# Patient Record
Sex: MALE | Race: WHITE | Employment: OTHER | URBAN - METROPOLITAN AREA
[De-identification: names, ages, dates, MRNs, and addresses within clinical notes are randomized per-mention and may not be internally consistent; named-entity substitution may affect disease eponyms.]

---

## 2018-01-22 ENCOUNTER — HOSPITAL ENCOUNTER (EMERGENCY)
Facility: CLINIC | Age: 28
Discharge: HOME | End: 2018-01-22
Attending: PHYSICIAN ASSISTANT | Admitting: PHYSICIAN ASSISTANT
Payer: COMMERCIAL

## 2018-01-22 VITALS
OXYGEN SATURATION: 99 % | TEMPERATURE: 97.7 F | HEART RATE: 100 BPM | DIASTOLIC BLOOD PRESSURE: 88 MMHG | HEIGHT: 69 IN | WEIGHT: 140 LBS | SYSTOLIC BLOOD PRESSURE: 112 MMHG | BODY MASS INDEX: 20.73 KG/M2 | RESPIRATION RATE: 24 BRPM

## 2018-01-22 DIAGNOSIS — K13.79 MOUTH PAIN: ICD-10-CM

## 2018-01-22 PROCEDURE — 99283 EMERGENCY DEPT VISIT LOW MDM: CPT

## 2018-01-22 RX ORDER — PENICILLIN V POTASSIUM 500 MG/1
500 TABLET, FILM COATED ORAL 4 TIMES DAILY
Qty: 28 TABLET | Refills: 0 | Status: SHIPPED | OUTPATIENT
Start: 2018-01-22 | End: 2018-01-29

## 2018-01-22 RX ORDER — IBUPROFEN 600 MG/1
600 TABLET, FILM COATED ORAL EVERY 6 HOURS PRN
Qty: 30 TABLET | Refills: 0 | Status: SHIPPED | OUTPATIENT
Start: 2018-01-22 | End: 2020-03-30

## 2018-01-22 RX ORDER — HYDROCODONE BITARTRATE AND ACETAMINOPHEN 5; 325 MG/1; MG/1
1-2 TABLET ORAL EVERY 4 HOURS PRN
Qty: 12 TABLET | Refills: 0 | Status: SHIPPED | OUTPATIENT
Start: 2018-01-22 | End: 2020-08-09

## 2018-01-22 ASSESSMENT — ENCOUNTER SYMPTOMS: FEVER: 0

## 2018-01-22 NOTE — ED AVS SNAPSHOT
[facility] Emergency Department    [address]    [facility] [address]    Phone:  [phone]    Fax:  [phone]                                       Allan Blanca   MRN: [id number]    Department:  [facility] Emergency Department   Date of Visit:  1/22/2018           After Visit Summary Signature Page     I have received my discharge instructions, and my questions have been answered. I have discussed any challenges I see with this plan with the nurse or doctor.    ..........................................................................................................................................  Patient/Patient Representative Signature      ..........................................................................................................................................  Patient Representative Print Name and Relationship to Patient    ..................................................               ................................................  Date                                            Time    ..........................................................................................................................................  Reviewed by Signature/Title    ...................................................              ..............................................  Date                                                            Time

## 2018-01-22 NOTE — ED AVS SNAPSHOT
[facility] Emergency Department    [address]    [facility] [address]    Phone:  [phone]    Fax:  [phone]                                       Allan Blanca   MRN: [id number]    Department:  [facility] Emergency Department   Date of Visit:  1/22/2018           Patient Information     Date Of Birth          1990        Your diagnoses for this visit were:     Mouth pain        You were seen by Megan Tamez PA-C.      Follow-up Information     Follow up with [facility] In 3 days.    Specialties:  Sports Medicine, Pain Management, Obstetrics & Gynecology, Pediatrics, Internal Medicine, Nephrology    Why:  As needed    Contact information:    [address]  [facility] [address] [phone]        Follow up with [facility] Emergency Department.    Specialty:  EMERGENCY MEDICINE    Why:  If symptoms worsen    Contact information:    [address]  [facility] [address] [phone]        Discharge Instructions       Take ibuprofen 600 mg every 6 hours for the next 3-5 days or until followup with dentist. If given other medications used as directed.  Use Oil of Clove topically. If given antibiotics make sure and finish the entire course. Ultimately you need to follow up with your dentist. If you cannot get into your dentist in the next couple of days I have given you a list of other options for dental care. You will need to get further pain management from your dentist. Return to emergency room if you develop high fevers, difficulty breathing, shortness of breath, the inability to swallow your own saliva, or for other concerns.    Emergency Dental Care  [email or web address]."[email or web address]   [address]   Directions   [phone]    Emergency Dental Services  [email or web address]  Emergency Dental Clinic You Can Rely On. Open 24 Hours. Call Now.  [address], [place]   Directions   [phone]    [facility]  Address: [address], [address], [place] [address]   Phone: [phone]    Dental Clinics Accepting [place] Clients    [facility]    Child and Teen Checkups  [facility]  [phone]    [facility]  [address]  [place], [place]  [phone]    [facility]  [address] [place], [place]  [phone]    [facility]  [address]  [phone]    [facility]  [address]  [phone]    [facility]  [address] [facility]  [phone]    [facility]  [address] [facility]  [phone]    [facility]  [address], [address] [facility]  [phone]    [place] [facility]  [place]  [phone]    [address]  [phone]    [facility]  [address] [facility]  [phone]    [facility]  [address] [place] [address]  [phone]    [facility]  [address] [facility]  [phone]    [facility]  [address] [facility]  [phone]    ADDITIONAL RESOURCES    [facility]  [phone]    [facility]  [phone]    [facility]  [phone]    This web site contains many locations and information about what services they provide:    http://[place]-low-cost-nealqx-efsp-mmnsarhmq8.friendshelpingfriends.Douban.Nutritionix/      24 Hour Appointment Hotline       To make an appointment at any [facility], call [phone] ([phone]). If you don't have a family doctor or clinic, we will help you find one. [facility] are conveniently located to serve the needs of you and your family.             Review of your medicines      START taking        Dose / Directions Last dose taken    penicillin V potassium 500 MG  tablet   Commonly known as:  VEETID   Dose:  500 mg   Quantity:  28 tablet        Take 1 tablet (500 mg) by mouth 4 times daily for 7 days   Refills:  0          CONTINUE these medicines which may have CHANGED, or have new prescriptions. If we are uncertain of the size of tablets/capsules you have at home, strength may be listed as something that might have changed.        Dose / Directions Last dose taken    * HYDROcodone-acetaminophen 5-325 MG per tablet   Commonly known as:  NORCO   Dose:  1 tablet   What changed:  Another medication with the same name was added. Make sure you understand how and when to take each.   Quantity:  10 tablet        Take 1 tablet by mouth every 8 hours as needed for moderate to severe pain   Refills:  0        * HYDROcodone-acetaminophen 5-325 MG per tablet   Commonly known as:  NORCO   Dose:  1-2 tablet   What changed:  You were already taking a medication with the same name, and this prescription was added. Make sure you understand how and when to take each.   Quantity:  12 tablet        Take 1-2 tablets by mouth every 4 hours as needed for moderate to severe pain   Refills:  0        * Notice:  This list has 2 medication(s) that are the same as other medications prescribed for you. Read the directions carefully, and ask your doctor or other care provider to review them with you.            Prescriptions were sent or printed at these locations (2 Prescriptions)                   Other Prescriptions                Printed at Department/Unit printer (2 of 2)         penicillin V potassium (VEETID) 500 MG tablet               HYDROcodone-acetaminophen (NORCO) 5-325 MG per tablet                Orders Needing Specimen Collection     None      Pending Results     No orders found from 1/20/2018 to 1/23/2018.            Pending Culture Results     No orders found from 1/20/2018 to 1/23/2018.            Pending Results Instructions     If you had any lab results that were not finalized at  the time of your Discharge, you can call the ED Lab Result RN at [phone]. You will be contacted by this team for any positive Lab results or changes in treatment. The nurses are available 7 days a week from 10A to 6:30P.  You can leave a message 24 hours per day and they will return your call.        Test Results From Your Hospital Stay               Clinical Quality Measure: Blood Pressure Screening     Your blood pressure was checked while you were in the emergency department today. The last reading we obtained was  BP: 112/88 . Please read the guidelines below about what these numbers mean and what you should do about them.  If your systolic blood pressure (the top number) is less than 120 and your diastolic blood pressure (the bottom number) is less than 80, then your blood pressure is normal. There is nothing more that you need to do about it.  If your systolic blood pressure (the top number) is 120-139 or your diastolic blood pressure (the bottom number) is 80-89, your blood pressure may be higher than it should be. You should have your blood pressure rechecked within a year by a primary care provider.  If your systolic blood pressure (the top number) is 140 or greater or your diastolic blood pressure (the bottom number) is 90 or greater, you may have high blood pressure. High blood pressure is treatable, but if left untreated over time it can put you at risk for heart attack, stroke, or kidney failure. You should have your blood pressure rechecked by a primary care provider within the next 4 weeks.  If your provider in the emergency department today gave you specific instructions to follow-up with your doctor or provider even sooner than that, you should follow that instruction and not wait for up to 4 weeks for your follow-up visit.        Thank you for choosing [facility]       Thank you for choosing [facility] for your care. Our goal is always to provide you with excellent care. Hearing back from our  "patients is one way we can continue to improve our services. Please take a few minutes to complete the written survey that you may receive in the mail after you visit with us. Thank you!        [facility] Center[facility]Publicate Information     [employer] lets you send messages to your doctor, view your test results, renew your prescriptions, schedule appointments and more. To sign up, go to www.[email or web address]..[email or web address]/[email or web address] . Click on \"Log in\" on the left side of the screen, which will take you to the Welcome page. Then click on \"Sign up Now\" on the right side of the page.     You will be asked to enter the access code listed below, as well as some personal information. Please follow the directions to create your username and password.     Your access code is: [email or web address]  Expires: 2018  9:20 PM     Your access code will  in 90 days. If you need help or a new code, please call your [place] clinic or [phone].        Care EveryWhere ID     This is your Care EveryWhere ID. This could be used by other organizations to access your [facility] medical records  [id number]        Equal Access to Services     JAMAICA SAEED : Hadana maria Nixon, justine howard, mich whitmore, miguel beth . [facility] [phone].    ATENCIÓN: Si habla español, tiene a winters disposición servicios gratuitos de asistencia lingüística. Lyn al [phone].    We comply with applicable federal civil rights laws and [place] laws. We do not discriminate on the basis of race, color, national origin, age, disability, sex, sexual orientation, or gender identity.            After Visit Summary       This is your record. Keep this with you and show to your community pharmacist(s) and doctor(s) at your next visit.                  "

## 2018-01-23 NOTE — DISCHARGE INSTRUCTIONS
Take ibuprofen 600 mg every 6 hours for the next 3-5 days or until followup with dentist. If given other medications used as directed.  Use Oil of Clove topically. If given antibiotics make sure and finish the entire course. Ultimately you need to follow up with your dentist. If you cannot get into your dentist in the next couple of days I have given you a list of other options for dental care. You will need to get further pain management from your dentist. Return to emergency room if you develop high fevers, difficulty breathing, shortness of breath, the inability to swallow your own saliva, or for other concerns.    Emergency Dental Care  [email or web address]   [address], [place]   Directions   [phone]    Emergency Dental Services  [email or web address]  Emergency Dental Clinic You Can Rely On. Open 24 Hours. Call Now.  [address], [place]   Directions   [phone]    [facility]  Address: [address] [place] [address], [address], [place] [address]   Phone: [phone]    Dental Clinics Accepting [facility]    Child and Teen Checkups  [facility]  [phone]    [facility]  [address]  [place], [place]  [phone]    The [address]  [phone]    [facility]  [address] [facility]  [phone]    [facility]  [address] [facility]  [phone]    [facility]  [address] [facility]  [phone]    [facility]  [address] [facility]  [phone]    [facility]  [address] [facility]  [phone]    [place] [facility]  [place]  [phone]    [address]  [phone]    [facility]  [address] [facility]  [phone]    [facility]  [address] [facility]  [phone]    [facility]  [address] [facility]  [phone]    [facility]  [address] [facility]  [phone]    ADDITIONAL RESOURCES    [facility]  [phone]    [facility]  [phone]    [facility]  [phone]    This web site contains many locations and information about what services they provide:    http://[place]-low-cost-qxsnni-biia-idvvzcjsw2.friendsabbifrienshira.Nitro PDF.Siteminis/

## 2018-01-23 NOTE — ED PROVIDER NOTES
"  History     Chief Complaint:  Dental Pain    The patient's history was somewhat limited due to being a poor historian.     STACY Blanca is a 27 year old male with a history of bipolar disorder and anxiety who presents with dental pain. Of note, the patient was seen here in the ED on 08/31/2016 for both upper and lower jaw pain following a tooth extraction. He was prescribed Penicillin and Norco and discharged home. Since then, the patient reports that he has had gradual onset of tooth pain in his left, lower, back bottom teeth that has progressively worsened. He has taken 3 Ibuprofen today and put one Ibuprofen on the tooth, with no positive or negative effects, prompting today's visit to the emergency department.     Here now in the emergency department, he reports continued left lower tooth pain, but denies fever, and any changes in his ability to eat or drink as normal. Of note, he reports that he does have dental insurance but does not see a dentist regularly because he does not have access to reliable transportation to reach his regular dentist. He denies recent use of antibiotics.     Allergies:  No Known Allergies     Medications:    Norco    Past Medical History:    Anxiety  Bipolar Disorder    Past Surgical History:    The patient does not have any pertinent past surgical history.    Family History:    No past pertinent family history.    Social History:  The patient presents alone.   Marital Status:  Single     Review of Systems   Constitutional: Negative for fever.   HENT: Positive for dental problem (Positive for left, lower tooth pain).    All other systems reviewed and are negative.    Physical Exam   First Vitals:  BP: 112/88  Pulse: 100  Temp: 97.7  F (36.5  C)  Resp: 24  Height: 175.3 cm (5' 9\")  Weight: 63.5 kg (140 lb)  SpO2: 99 %    Physical Exam  General: Well-nourished, No obvious discomfort  Eyes: PERRL, conjunctivae pink no scleral icterus or conjunctival injection  ENT:  Poor " dentition. Moist mucus membranes.  Tooth # 18 and 19 with tenderness on palpation.  No drainage.  No abscess along gumline.  No cheek or submandibular edema.  No trismus.  Normal voice.  Respiratory:  Normal respiratory effort. No cough  CV: Normal rate   Neuro: Alert and oriented to person/place/time  Skin: Warm and dry. Normal appearance of visualized exposed skin  Psychiatric: Affect normal. Normal personal interaction. Good eye contact.    Emergency Department Course   Emergency Department Course:  Nursing notes and vitals reviewed. 2112 I performed an exam of the patient as documented above.     Findings and plan explained to the Patient. Patient discharged home with instructions regarding supportive care, medications, and reasons to return. The importance of close follow-up was reviewed. The patient was prescribed Norco and Penicillin.      Impression & Plan    Medical Decision Making:  Allan Blanca is a 27 year old male who presents for evaluation of tooth pain. There is no abscess detected around the tooth amenable to incision and drainage. The differential diagnosis includes: cracked tooth syndrome, pulpitis, sub-apical abscess, facial cellulitis, alveolitis amongst others. There is no evidence of deep space infections, significant facial swelling or Ulises's angina. There are no posterior pharyngeal space infections detected. Follow up with a dentist in the coming days is indicated for further work up and treatment. Provided dental list.     Diagnosis:    ICD-10-CM   1. Mouth pain K13.79       Disposition:  discharged to home    Discharge Medications:  New Prescriptions    HYDROCODONE-ACETAMINOPHEN (NORCO) 5-325 MG PER TABLET    Take 1-2 tablets by mouth every 4 hours as needed for moderate to severe pain    PENICILLIN V POTASSIUM (VEETID) 500 MG TABLET    Take 1 tablet (500 mg) by mouth 4 times daily for 7 days     Zuly MORGAN, am serving as a scribe on 1/22/2018 at 9:07 PM to personally document  services performed by Megan Tamez PA based on my observations and the provider's statements to me.       Zuly Jimenez  1/22/2018   [facility] EMERGENCY DEPARTMENT       Megan Tamez PA-C  01/22/18 0748

## 2018-05-31 ENCOUNTER — HOSPITAL ENCOUNTER (EMERGENCY)
Facility: CLINIC | Age: 28
Discharge: HOME | End: 2018-05-31
Attending: EMERGENCY MEDICINE | Admitting: EMERGENCY MEDICINE
Payer: COMMERCIAL

## 2018-05-31 VITALS
TEMPERATURE: 97.6 F | OXYGEN SATURATION: 97 % | WEIGHT: 121.25 LBS | HEART RATE: 67 BPM | BODY MASS INDEX: 17.96 KG/M2 | HEIGHT: 69 IN | RESPIRATION RATE: 16 BRPM | DIASTOLIC BLOOD PRESSURE: 78 MMHG | SYSTOLIC BLOOD PRESSURE: 120 MMHG

## 2018-05-31 DIAGNOSIS — H92.01 OTALGIA, RIGHT: ICD-10-CM

## 2018-05-31 PROCEDURE — 99282 EMERGENCY DEPT VISIT SF MDM: CPT

## 2018-05-31 ASSESSMENT — ENCOUNTER SYMPTOMS: FEVER: 0

## 2018-05-31 NOTE — ED AVS SNAPSHOT
[facility] Emergency Department    [address]    [facility] [address]    Phone:  [phone]    Fax:  [phone]                                       Allan Blanca   MRN: [id number]    Department:  [facility] Emergency Department   Date of Visit:  5/31/2018           After Visit Summary Signature Page     I have received my discharge instructions, and my questions have been answered. I have discussed any challenges I see with this plan with the nurse or doctor.    ..........................................................................................................................................  Patient/Patient Representative Signature      ..........................................................................................................................................  Patient Representative Print Name and Relationship to Patient    ..................................................               ................................................  Date                                            Time    ..........................................................................................................................................  Reviewed by Signature/Title    ...................................................              ..............................................  Date                                                            Time

## 2018-05-31 NOTE — ED PROVIDER NOTES
"  History     Chief Complaint:  Otalgia    HPI   Allan Blanca is a 27 year old male who presents with right-sided otalgia. The patient states this has been a problem for the last 2 days, worse tonight. There has been some drainage from the right ear. He has a history of ear infections, and is very familiar with the sensation. The patient denies fevers, and states no other concerns at this time.      Allergies:  No known drug allergies.      Medications:    HYDROcodone-acetaminophen (NORCO) 5-325 MG per tablet  ibuprofen (ADVIL/MOTRIN) 600 MG tablet     Past Medical History:    Anxiety  Bipolar 1 disorder     Past Surgical History:    History reviewed. No pertinent past surgical history.     Family History:    History reviewed. No pertinent family history.     Social History:  Marital Status:  Single   Smoking status: Never smoker  Alcohol use: No     Review of Systems   Constitutional: Negative for fever.   HENT: Positive for ear discharge and ear pain.    All other systems reviewed and are negative.    Physical Exam     Patient Vitals for the past 24 hrs:   BP Temp Temp src Pulse Resp SpO2 Height Weight   05/31/18 0152 120/78 97.6  F (36.4  C) Temporal 67 16 97 % 1.753 m (5' 9\") 55 kg (121 lb 4.1 oz)      Physical Exam  Vital signs and nursing notes reviewed.     Constitutional: laying on gurney appears comfortable  HENT: No evidence of facial or head injury.  Right TM is partially obscured, significant scar tissue in the ear canal, possible chronic perforation, no evidence of mastoiditis, no intraoral swelling  Eyes: Conjunctivae are normal bilaterally. Pupils equal  Neck: normal range of motion  Cardiovascular: Normal rate.    Pulmonary/Chest: No respiratory distress.   Musculoskeletal: normal  Neurological: Alert and oriented. No focal weakness  Skin: Skin is warm and dry. No rash noted.   Psych: normal affect      Emergency Department Course     Emergency Department Course:  Nursing notes and vitals " reviewed.  I performed an exam of the patient as documented above.   Findings and plan explained to the patient. Patient discharged home with instructions regarding supportive care, medications and reasons to return. The importance of close follow-up was reviewed.      Impression & Plan      Medical Decision Making:   Allan Blanca is a 27 year old male who presents to the emergency department for evaluation approximately 2 days of increased right ear pain.  On exam patient has fluid behind the right TM also cannot exclude chronic TM perforation.  Patient has had multiple surgeries in the right ear.  No evidence of mastoiditis or any other concerning findings on examination.  Started on Augmentin.  He should see ENT specialist, primary care physician in the next week.  Return here for any new or worsening.  Patient understands plan.  Discharged home.    Diagnosis:    ICD-10-CM    1. Otalgia, right H92.01       Disposition:    Discharged to home with below prescription    Discharge Medications:  New Prescriptions    AMOXICILLIN-CLAVULANATE (AUGMENTIN) 875-125 MG PER TABLET    Take 1 tablet by mouth 2 times daily for 10 days      Scribe Disclosure:  Jonathan MORGAN, am serving as a scribe at 2:31 AM on 5/31/2018 to document services personally performed by Vish Topete MD, based on my observations and the provider's statements to me.    [facility] EMERGENCY DEPARTMENT       Vish Topete MD  05/31/18 2751

## 2018-05-31 NOTE — ED AVS SNAPSHOT
[facility] Emergency Department    [address]    [facility] [address]    Phone:  [phone]    Fax:  [phone]                                       Allan Blanca   MRN: [id number]    Department:  [facility] Emergency Department   Date of Visit:  5/31/2018           Patient Information     Date Of Birth          1990        Your diagnoses for this visit were:     Otalgia, right        You were seen by Vish Topete MD.      Follow-up Information     Follow up with No Ref-Primary, Physician In 1 week.        Follow up with [facility] Emergency Department.    Specialty:  EMERGENCY MEDICINE    Why:  If symptoms worsen    Contact information:    [address]  [facility] [address] [phone]      Discharge References/Attachments     OTITIS MEDIA (MIDDLE-EAR INFECTION) IN ADULTS (ENGLISH)      24 Hour Appointment Hotline       To make an appointment at any [facility] clinic, call [phone] ([phone]). If you don't have a family doctor or clinic, we will help you find one. [facility] are conveniently located to serve the needs of you and your family.             Review of your medicines      START taking        Dose / Directions Last dose taken    amoxicillin-clavulanate 875-125 MG per tablet   Commonly known as:  AUGMENTIN   Dose:  1 tablet   Quantity:  20 tablet        Take 1 tablet by mouth 2 times daily for 10 days   Refills:  0          Our records show that you are taking the medicines listed below. If these are incorrect, please call your family doctor or clinic.        Dose / Directions Last dose taken    * HYDROcodone-acetaminophen 5-325 MG per tablet   Commonly known as:  NORCO   Dose:  1 tablet   Quantity:  10 tablet        Take 1 tablet by mouth every 8 hours as needed for moderate to severe pain   Refills:  0        * HYDROcodone-acetaminophen 5-325 MG per tablet   Commonly known as:  NORCO   Dose:   1-2 tablet   Quantity:  12 tablet        Take 1-2 tablets by mouth every 4 hours as needed for moderate to severe pain   Refills:  0        ibuprofen 600 MG tablet   Commonly known as:  ADVIL/MOTRIN   Dose:  600 mg   Quantity:  30 tablet        Take 1 tablet (600 mg) by mouth every 6 hours as needed for moderate pain   Refills:  0        * Notice:  This list has 2 medication(s) that are the same as other medications prescribed for you. Read the directions carefully, and ask your doctor or other care provider to review them with you.            Prescriptions were sent or printed at these locations (1 Prescription)                   Other Prescriptions                Printed at Department/Unit printer (1 of 1)         amoxicillin-clavulanate (AUGMENTIN) 875-125 MG per tablet                Orders Needing Specimen Collection     None      Pending Results     No orders found from 5/29/2018 to 6/1/2018.            Pending Culture Results     No orders found from 5/29/2018 to 6/1/2018.            Pending Results Instructions     If you had any lab results that were not finalized at the time of your Discharge, you can call the ED Lab Result RN at [phone]. You will be contacted by this team for any positive Lab results or changes in treatment. The nurses are available 7 days a week from 10A to 6:30P.  You can leave a message 24 hours per day and they will return your call.        Test Results From Your Hospital Stay               Clinical Quality Measure: Blood Pressure Screening     Your blood pressure was checked while you were in the emergency department today. The last reading we obtained was  BP: 120/78 . Please read the guidelines below about what these numbers mean and what you should do about them.  If your systolic blood pressure (the top number) is less than 120 and your diastolic blood pressure (the bottom number) is less than 80, then your blood pressure is normal. There is nothing more that you need to do  "about it.  If your systolic blood pressure (the top number) is 120-139 or your diastolic blood pressure (the bottom number) is 80-89, your blood pressure may be higher than it should be. You should have your blood pressure rechecked within a year by a primary care provider.  If your systolic blood pressure (the top number) is 140 or greater or your diastolic blood pressure (the bottom number) is 90 or greater, you may have high blood pressure. High blood pressure is treatable, but if left untreated over time it can put you at risk for heart attack, stroke, or kidney failure. You should have your blood pressure rechecked by a primary care provider within the next 4 weeks.  If your provider in the emergency department today gave you specific instructions to follow-up with your doctor or provider even sooner than that, you should follow that instruction and not wait for up to 4 weeks for your follow-up visit.        Thank you for choosing [facility]       Thank you for choosing [facility] for your care. Our goal is always to provide you with excellent care. Hearing back from our patients is one way we can continue to improve our services. Please take a few minutes to complete the written survey that you may receive in the mail after you visit with us. Thank you!        [employer]WillCall Information     [employer] lets you send messages to your doctor, view your test results, renew your prescriptions, schedule appointments and more. To sign up, go to [email or web address] . Click on \"Log in\" on the left side of the screen, which will take you to the Welcome page. Then click on \"Sign up Now\" on the right side of the page.     You will be asked to enter the access code listed below, as well as some personal information. Please follow the directions to create your username and password.     Your access code is: [email or web address]  Expires: 2018  2:38 AM     Your access code will  in 90 days. If you need help or a new code, please " call your [facility] clinic or [phone].        Care EveryWhere ID     This is your Care EveryWhere ID. This could be used by other organizations to access your [facility] medical records  [id number]        Equal Access to Services     JAMAICA SAEED : Ricardo Nixon, waadamda luqadaha, qaybta kaalmada haim, miguel sun. So [facility] [phone].    ATENCIÓN: Si habla español, tiene a winters disposición servicios gratuitos de asistencia lingüística. Llame al [phone].    We comply with applicable federal civil rights laws and [place] laws. We do not discriminate on the basis of race, color, national origin, age, disability, sex, sexual orientation, or gender identity.            After Visit Summary       This is your record. Keep this with you and show to your community pharmacist(s) and doctor(s) at your next visit.

## 2020-03-30 ENCOUNTER — HOSPITAL ENCOUNTER (EMERGENCY)
Facility: CLINIC | Age: 30
Discharge: HOME | End: 2020-03-30
Attending: EMERGENCY MEDICINE | Admitting: EMERGENCY MEDICINE
Payer: COMMERCIAL

## 2020-03-30 VITALS
RESPIRATION RATE: 20 BRPM | DIASTOLIC BLOOD PRESSURE: 118 MMHG | SYSTOLIC BLOOD PRESSURE: 145 MMHG | TEMPERATURE: 97.8 F | OXYGEN SATURATION: 100 % | HEART RATE: 108 BPM

## 2020-03-30 DIAGNOSIS — H60.393 INFECTIVE OTITIS EXTERNA, BILATERAL: ICD-10-CM

## 2020-03-30 PROCEDURE — 99282 EMERGENCY DEPT VISIT SF MDM: CPT

## 2020-03-30 RX ORDER — CIPROFLOXACIN 500 MG/1
500 TABLET, FILM COATED ORAL 2 TIMES DAILY
Qty: 14 TABLET | Refills: 0 | Status: SHIPPED | OUTPATIENT
Start: 2020-03-30 | End: 2020-08-09

## 2020-03-30 RX ORDER — IBUPROFEN 600 MG/1
600 TABLET, FILM COATED ORAL EVERY 6 HOURS PRN
Qty: 30 TABLET | Refills: 0 | Status: SHIPPED | OUTPATIENT
Start: 2020-03-30 | End: 2020-08-09

## 2020-03-30 RX ORDER — TRAMADOL HYDROCHLORIDE 50 MG/1
50 TABLET ORAL EVERY 6 HOURS PRN
Qty: 10 TABLET | Refills: 0 | Status: SHIPPED | OUTPATIENT
Start: 2020-03-30 | End: 2020-08-09

## 2020-03-30 RX ORDER — CIPROFLOXACIN AND DEXAMETHASONE 3; 1 MG/ML; MG/ML
4 SUSPENSION/ DROPS AURICULAR (OTIC) 2 TIMES DAILY
Qty: 1 BOTTLE | Refills: 0 | Status: SHIPPED | OUTPATIENT
Start: 2020-03-30 | End: 2020-08-09

## 2020-03-30 ASSESSMENT — ENCOUNTER SYMPTOMS
FEVER: 0
SHORTNESS OF BREATH: 0
SORE THROAT: 0
COUGH: 0

## 2020-03-30 NOTE — ED AVS SNAPSHOT
[facility] Emergency Department  [address]  [facility] [address]  Phone:  [phone]  Fax:  [phone]                                    Allan Blanca   MRN: [id number]    Department:  [facility] Emergency Department   Date of Visit:  3/30/2020           After Visit Summary Signature Page    I have received my discharge instructions, and my questions have been answered. I have discussed any challenges I see with this plan with the nurse or doctor.    ..........................................................................................................................................  Patient/Patient Representative Signature      ..........................................................................................................................................  Patient Representative Print Name and Relationship to Patient    ..................................................               ................................................  Date                                   Time    ..........................................................................................................................................  Reviewed by Signature/Title    ...................................................              ..............................................  Date                                               Time          22EPIC Rev 08/18

## 2020-03-31 NOTE — ED PROVIDER NOTES
"  History     Chief Complaint:  Bilateral ear pain    HPI   Allan Blanca is a 29 year old male current tobacco user with a history of cleft lip/palate who presents with bilateral ear pain. The patient states that due to previous surgeries, he has scar tissue in his ears that causes frequent ear infections. He states that the pain in his bilateral ears is severe and has impacted his hearing. He notes drainage from both ears. No URI symptoms, sore throat, headache, nausea or vomiting. He states that he has a ENT specialist, but has not seen him in a while. He denies using any drops or other medications to treat the pain. The patient has had multiple ear tubes for the infections. He denies any fever, shortness of breath, or cough. He notes the symptoms have been going on \"for awhile\" and cannot specify an exact time of onset. He states that he \"just deals with it until it gets too bad.\"    Allergies:  Nicotine   Tylenol     Medications:    The patient is not currently taking any prescribed medications.    Past Medical History:    Anxiety   Bipolar 1 disorder  Schizophrenia  Perforated tympanic membrane  Depression   TMJ    Past Surgical History:    TN Anes cleft lip repair   TN unlisted procedure middle ear  Myringotomy with tube placement     Family History:    History reviewed. No pertinent family history.     Social History:  Smoking status: current user  Alcohol use: unknown  Drug use: unknown  The patient presents to the emergency department alone  PCP: No Ref-Primary, Physician  Marital Status:  Single [1]    Review of Systems   Constitutional: Negative for fever.   HENT: Positive for ear pain and hearing loss. Negative for sore throat.    Respiratory: Negative for cough and shortness of breath.    All other systems reviewed and are negative.    Physical Exam     Patient Vitals for the past 24 hrs:   BP Temp Temp src Pulse Resp SpO2   03/30/20 2107 (!) 145/118 97.8  F (36.6  C) Temporal 108 20 100 % "       Physical Exam  General: Well appearing, nontoxic. Resting comfortably  Head:  Scalp, face, and head appear normal  Eyes:  Pupils equal, round, and reactive to light    Conjunctivae noninjected and sclera white  ENT:    The nose is normal    Bilateral auditory canals with tan drainage, soft tissue swelling and erythema. The bilateral TMs are partially obscured however the TMs appear moderately erythematous. No obvious perforation based on limited exam. No mastoid tenderness.     MMM. Posterior pharynx clear without swelling, exudates or erythema. No mucosal lesions, tongue or lip swelling. No tongue elevation. Uvula normal without deviation. Voice normal. No drooling or trismus.   Neck:  Normal range of motion  CV:  RRR, no M/R/G  Resp:  CTAB, no increased WOB   MSK:  Normal tone.   Skin:  No rash or lesions noted.  Neuro: Speech is normal and fluent    Moves all extremities spontaneously  Psych:  Awake, Alert. Appropriate interactions           Emergency Department Course   Emergency Department Course:  Past medical records, nursing notes, and vitals reviewed.  2134: I performed an exam of the patient as documented above. Clinical findings and plan explained to the Patient. Patient discharged home with instructions regarding supportive care, medications, and reasons to return as well as the importance of close follow-up were reviewed.     Impression & Plan   Medical Decision Making:  Allan Blanca is a 29 year old male who presents for evaluation of otalgia bilaterally.  The patient has an exam consistent with otitis externa bilaterally.  Differential considered in this patient with otalgia included mastoiditis, meningitis, TM perforation, cerumen impaction, mass, dental abscess, or peritonsillar abscess, referred pain, cholesteatoma, otitis externa, etc.  He may take Tylenol or Ibuprofen or Tramadol for pain.  Drops for the externa are noted below. Patient was also prescribed Cipro to treat the infection.   Return if increasing pain, fever, decrease in hearing or ear discharge.  Follow-up with ENT in 1 week for recheck. Return precautions were discussed with patient. The patient's questions were answered and the patient was agreeable with discharge.     Diagnosis:    ICD-10-CM    1. Infective otitis externa, bilateral  H60.393        Disposition:  Discharged to home.    Discharge Medications:  New Prescriptions    CIPROFLOXACIN (CIPRO) 500 MG TABLET    Take 1 tablet (500 mg) by mouth 2 times daily for 7 days    CIPROFLOXACIN-DEXAMETHASONE (CIPRODEX) 0.3-0.1 % OTIC SUSPENSION    Place 4 drops into both ears 2 times daily for 7 days    IBUPROFEN (ADVIL/MOTRIN) 600 MG TABLET    Take 1 tablet (600 mg) by mouth every 6 hours as needed for fever or pain Take with food.    TRAMADOL (ULTRAM) 50 MG TABLET    Take 1 tablet (50 mg) by mouth every 6 hours as needed for severe pain     Ronit Rivas  3/30/2020   [facility] EMERGENCY DEPARTMENT  Scribe Disclosure:  I, Ronit Rivas, am serving as a scribe at 9:34 PM on 3/30/2020 to document services personally performed by Jorge Law MD based on my observations and the provider's statements to me.        Jorge Law MD  03/31/20 1607

## 2020-08-09 ENCOUNTER — HOSPITAL ENCOUNTER (EMERGENCY)
Facility: CLINIC | Age: 30
Discharge: HOME | End: 2020-08-09
Attending: EMERGENCY MEDICINE | Admitting: EMERGENCY MEDICINE
Payer: COMMERCIAL

## 2020-08-09 VITALS
DIASTOLIC BLOOD PRESSURE: 97 MMHG | RESPIRATION RATE: 24 BRPM | HEART RATE: 110 BPM | SYSTOLIC BLOOD PRESSURE: 140 MMHG | TEMPERATURE: 97.9 F | OXYGEN SATURATION: 100 %

## 2020-08-09 DIAGNOSIS — H72.93 PERFORATION OF BOTH TYMPANIC MEMBRANES: ICD-10-CM

## 2020-08-09 DIAGNOSIS — R46.89 EPISODE OF ABNORMAL BEHAVIOR: ICD-10-CM

## 2020-08-09 LAB
ANION GAP SERPL CALCULATED.3IONS-SCNC: 11 MMOL/L (ref 3–14)
BASOPHILS # BLD AUTO: 0.1 10E9/L (ref 0–0.2)
BASOPHILS NFR BLD AUTO: 0.4 %
BUN SERPL-MCNC: 25 MG/DL (ref 7–30)
CALCIUM SERPL-MCNC: 9 MG/DL (ref 8.5–10.1)
CHLORIDE SERPL-SCNC: 104 MMOL/L (ref 94–109)
CO2 SERPL-SCNC: 22 MMOL/L (ref 20–32)
CREAT SERPL-MCNC: 0.98 MG/DL (ref 0.66–1.25)
DIFFERENTIAL METHOD BLD: ABNORMAL
EOSINOPHIL # BLD AUTO: 0 10E9/L (ref 0–0.7)
EOSINOPHIL NFR BLD AUTO: 0 %
ERYTHROCYTE [DISTWIDTH] IN BLOOD BY AUTOMATED COUNT: 13.1 % (ref 10–15)
ETHANOL SERPL-MCNC: <0.01 G/DL
GFR SERPL CREATININE-BSD FRML MDRD: >90 ML/MIN/{1.73_M2}
GLUCOSE SERPL-MCNC: 95 MG/DL (ref 70–99)
HCT VFR BLD AUTO: 39.7 % (ref 40–53)
HGB BLD-MCNC: 13.6 G/DL (ref 13.3–17.7)
IMM GRANULOCYTES # BLD: 0.1 10E9/L (ref 0–0.4)
IMM GRANULOCYTES NFR BLD: 0.6 %
LYMPHOCYTES # BLD AUTO: 1.5 10E9/L (ref 0.8–5.3)
LYMPHOCYTES NFR BLD AUTO: 7.3 %
MCH RBC QN AUTO: 31.2 PG (ref 26.5–33)
MCHC RBC AUTO-ENTMCNC: 34.3 G/DL (ref 31.5–36.5)
MCV RBC AUTO: 91 FL (ref 78–100)
MONOCYTES # BLD AUTO: 1.3 10E9/L (ref 0–1.3)
MONOCYTES NFR BLD AUTO: 6.3 %
NEUTROPHILS # BLD AUTO: 17.9 10E9/L (ref 1.6–8.3)
NEUTROPHILS NFR BLD AUTO: 85.4 %
NRBC # BLD AUTO: 0 10*3/UL
NRBC BLD AUTO-RTO: 0 /100
PLATELET # BLD AUTO: 368 10E9/L (ref 150–450)
POTASSIUM SERPL-SCNC: 3.5 MMOL/L (ref 3.4–5.3)
RBC # BLD AUTO: 4.36 10E12/L (ref 4.4–5.9)
SODIUM SERPL-SCNC: 137 MMOL/L (ref 133–144)
WBC # BLD AUTO: 21 10E9/L (ref 4–11)

## 2020-08-09 PROCEDURE — 80048 BASIC METABOLIC PNL TOTAL CA: CPT | Performed by: EMERGENCY MEDICINE

## 2020-08-09 PROCEDURE — 25000132 ZZH RX MED GY IP 250 OP 250 PS 637: Performed by: EMERGENCY MEDICINE

## 2020-08-09 PROCEDURE — 93005 ELECTROCARDIOGRAM TRACING: CPT

## 2020-08-09 PROCEDURE — 85025 COMPLETE CBC W/AUTO DIFF WBC: CPT | Performed by: EMERGENCY MEDICINE

## 2020-08-09 PROCEDURE — 80320 DRUG SCREEN QUANTALCOHOLS: CPT | Performed by: EMERGENCY MEDICINE

## 2020-08-09 PROCEDURE — 99284 EMERGENCY DEPT VISIT MOD MDM: CPT

## 2020-08-09 RX ORDER — OLANZAPINE 10 MG/2ML
10 INJECTION, POWDER, FOR SOLUTION INTRAMUSCULAR
Status: DISCONTINUED | OUTPATIENT
Start: 2020-08-09 | End: 2020-08-09 | Stop reason: HOSPADM

## 2020-08-09 RX ORDER — OLANZAPINE 5 MG/1
10 TABLET, ORALLY DISINTEGRATING ORAL ONCE
Status: COMPLETED | OUTPATIENT
Start: 2020-08-09 | End: 2020-08-09

## 2020-08-09 RX ADMIN — OLANZAPINE 10 MG: 5 TABLET, ORALLY DISINTEGRATING ORAL at 14:47

## 2020-08-09 NOTE — ED AVS SNAPSHOT
[facility] Emergency Department  [address]  [facility] [address]  Phone:  [phone]  Fax:  [phone]                                    Allan Blanca   MRN: [id number]    Department:  [facility] Emergency Department   Date of Visit:  8/9/2020           After Visit Summary Signature Page    I have received my discharge instructions, and my questions have been answered. I have discussed any challenges I see with this plan with the nurse or doctor.    ..........................................................................................................................................  Patient/Patient Representative Signature      ..........................................................................................................................................  Patient Representative Print Name and Relationship to Patient    ..................................................               ................................................  Date                                   Time    ..........................................................................................................................................  Reviewed by Signature/Title    ...................................................              ..............................................  Date                                               Time          22EPIC Rev 08/18

## 2020-08-09 NOTE — ED NOTES
"Pt refusing COVID swab testing.  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  \"If that is done to me it will be documented.  My advocate Ingris will be involved in this\".  MD informed and OK'd hold on swab for now.  "

## 2020-08-09 NOTE — ED NOTES
Ingris is here to visit pt.  She is informed that she may visit but if pt gets more agitated with her visit she may be asked to leave.  She is very agreeable to this.

## 2020-08-09 NOTE — ED TRIAGE NOTES
Presents via  [facility] after 911 was called for pt walking [place] along [address] and demonstrating erratic behavior. PD suggested pt be evaluated for drug abuse/ mental health crisis. Pt is speaking rapidly and demonstrating flight of thoughts. Oriented to place and self. VSS on RA.

## 2020-08-09 NOTE — ED NOTES
Pt spoke on phone with Ingris who he reports is his advocate and is entitled to come in and visit him.

## 2020-08-09 NOTE — ED NOTES
Pt laying in bed and continues to verbalize at a lower tone voice.  He became more agitated during his EKG electrode application.
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
## 2020-08-09 NOTE — ED NOTES
Pt continues to speak in rapid and incoherent sentences but volume of voice is slightly lower than when he arrived.
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
## 2020-08-10 LAB — INTERPRETATION ECG - MUSE: NORMAL
